# Patient Record
Sex: MALE | Race: WHITE | Employment: OTHER | URBAN - METROPOLITAN AREA
[De-identification: names, ages, dates, MRNs, and addresses within clinical notes are randomized per-mention and may not be internally consistent; named-entity substitution may affect disease eponyms.]

---

## 2021-03-27 DIAGNOSIS — Z11.59 ENCOUNTER FOR SCREENING FOR OTHER VIRAL DISEASES: ICD-10-CM

## 2021-04-08 RX ORDER — CELECOXIB 200 MG/1
200 CAPSULE ORAL DAILY PRN
COMMUNITY

## 2021-04-08 RX ORDER — SERTRALINE HYDROCHLORIDE 100 MG/1
200 TABLET, FILM COATED ORAL DAILY
COMMUNITY

## 2021-04-08 ASSESSMENT — MIFFLIN-ST. JEOR: SCORE: 2135.91

## 2021-04-13 ENCOUNTER — APPOINTMENT (OUTPATIENT)
Dept: GENERAL RADIOLOGY | Facility: CLINIC | Age: 46
End: 2021-04-13
Attending: ORTHOPAEDIC SURGERY
Payer: COMMERCIAL

## 2021-04-13 ENCOUNTER — ANESTHESIA EVENT (OUTPATIENT)
Dept: SURGERY | Facility: CLINIC | Age: 46
End: 2021-04-13
Payer: COMMERCIAL

## 2021-04-13 ENCOUNTER — ANESTHESIA (OUTPATIENT)
Dept: SURGERY | Facility: CLINIC | Age: 46
End: 2021-04-13
Payer: COMMERCIAL

## 2021-04-13 ENCOUNTER — HOSPITAL ENCOUNTER (OUTPATIENT)
Facility: CLINIC | Age: 46
Discharge: HOME | End: 2021-04-14
Attending: ORTHOPAEDIC SURGERY | Admitting: ORTHOPAEDIC SURGERY
Payer: COMMERCIAL

## 2021-04-13 DIAGNOSIS — M19.012 PRIMARY OSTEOARTHRITIS OF LEFT SHOULDER: Primary | ICD-10-CM

## 2021-04-13 LAB
ABO + RH BLD: NORMAL
ABO + RH BLD: NORMAL
BLD GP AB SCN SERPL QL: NORMAL
BLOOD BANK CMNT PATIENT-IMP: NORMAL
CREAT SERPL-MCNC: 0.79 MG/DL (ref 0.66–1.25)
GFR SERPL CREATININE-BSD FRML MDRD: >90 ML/MIN/{1.73_M2}
SPECIMEN EXP DATE BLD: NORMAL

## 2021-04-13 PROCEDURE — 86900 BLOOD TYPING SEROLOGIC ABO: CPT | Performed by: ANESTHESIOLOGY

## 2021-04-13 PROCEDURE — 258N000003 HC RX IP 258 OP 636: Performed by: NURSE ANESTHETIST, CERTIFIED REGISTERED

## 2021-04-13 PROCEDURE — 250N000013 HC RX MED GY IP 250 OP 250 PS 637: Performed by: ORTHOPAEDIC SURGERY

## 2021-04-13 PROCEDURE — 710N000009 HC RECOVERY PHASE 1, LEVEL 1, PER MIN: Performed by: ORTHOPAEDIC SURGERY

## 2021-04-13 PROCEDURE — 250N000011 HC RX IP 250 OP 636: Performed by: ORTHOPAEDIC SURGERY

## 2021-04-13 PROCEDURE — 250N000011 HC RX IP 250 OP 636: Performed by: NURSE ANESTHETIST, CERTIFIED REGISTERED

## 2021-04-13 PROCEDURE — 36415 COLL VENOUS BLD VENIPUNCTURE: CPT | Performed by: ANESTHESIOLOGY

## 2021-04-13 PROCEDURE — 86850 RBC ANTIBODY SCREEN: CPT | Performed by: ANESTHESIOLOGY

## 2021-04-13 PROCEDURE — C1713 ANCHOR/SCREW BN/BN,TIS/BN: HCPCS | Performed by: ORTHOPAEDIC SURGERY

## 2021-04-13 PROCEDURE — C1776 JOINT DEVICE (IMPLANTABLE): HCPCS | Performed by: ORTHOPAEDIC SURGERY

## 2021-04-13 PROCEDURE — 999N000179 XR SURGERY CARM FLUORO LESS THAN 5 MIN W STILLS: Mod: TC

## 2021-04-13 PROCEDURE — 999N000141 HC STATISTIC PRE-PROCEDURE NURSING ASSESSMENT: Performed by: ORTHOPAEDIC SURGERY

## 2021-04-13 PROCEDURE — 250N000009 HC RX 250: Performed by: ORTHOPAEDIC SURGERY

## 2021-04-13 PROCEDURE — 99203 OFFICE O/P NEW LOW 30 MIN: CPT | Performed by: INTERNAL MEDICINE

## 2021-04-13 PROCEDURE — 99207 PR CDG-CODE CATEGORY CHANGED: CPT | Performed by: INTERNAL MEDICINE

## 2021-04-13 PROCEDURE — 86901 BLOOD TYPING SEROLOGIC RH(D): CPT | Performed by: ANESTHESIOLOGY

## 2021-04-13 PROCEDURE — 370N000017 HC ANESTHESIA TECHNICAL FEE, PER MIN: Performed by: ORTHOPAEDIC SURGERY

## 2021-04-13 PROCEDURE — 82565 ASSAY OF CREATININE: CPT | Performed by: ORTHOPAEDIC SURGERY

## 2021-04-13 PROCEDURE — 360N000084 HC SURGERY LEVEL 4 W/ FLUORO, PER MIN: Performed by: ORTHOPAEDIC SURGERY

## 2021-04-13 PROCEDURE — 271N000001 HC OR GENERAL SUPPLY NON-STERILE: Performed by: ORTHOPAEDIC SURGERY

## 2021-04-13 PROCEDURE — 36415 COLL VENOUS BLD VENIPUNCTURE: CPT | Performed by: ORTHOPAEDIC SURGERY

## 2021-04-13 PROCEDURE — 258N000003 HC RX IP 258 OP 636: Performed by: ORTHOPAEDIC SURGERY

## 2021-04-13 PROCEDURE — 250N000009 HC RX 250: Performed by: NURSE ANESTHETIST, CERTIFIED REGISTERED

## 2021-04-13 PROCEDURE — 272N000001 HC OR GENERAL SUPPLY STERILE: Performed by: ORTHOPAEDIC SURGERY

## 2021-04-13 DEVICE — IMPLANTABLE DEVICE
Type: IMPLANTABLE DEVICE | Site: SHOULDER | Status: FUNCTIONAL
Brand: EQUINOXE

## 2021-04-13 RX ORDER — NEOSTIGMINE METHYLSULFATE 1 MG/ML
VIAL (ML) INJECTION PRN
Status: DISCONTINUED | OUTPATIENT
Start: 2021-04-13 | End: 2021-04-13

## 2021-04-13 RX ORDER — ACETAMINOPHEN 325 MG/1
650 TABLET ORAL EVERY 4 HOURS PRN
Status: DISCONTINUED | OUTPATIENT
Start: 2021-04-16 | End: 2021-04-14 | Stop reason: HOSPADM

## 2021-04-13 RX ORDER — ACETAMINOPHEN 325 MG/1
975 TABLET ORAL EVERY 8 HOURS
Status: DISCONTINUED | OUTPATIENT
Start: 2021-04-13 | End: 2021-04-14 | Stop reason: HOSPADM

## 2021-04-13 RX ORDER — CEFAZOLIN SODIUM 1 G/50ML
2000 SOLUTION INTRAVENOUS
Status: COMPLETED | OUTPATIENT
Start: 2021-04-13 | End: 2021-04-14

## 2021-04-13 RX ORDER — HYDROMORPHONE HYDROCHLORIDE 1 MG/ML
.3-.5 INJECTION, SOLUTION INTRAMUSCULAR; INTRAVENOUS; SUBCUTANEOUS EVERY 10 MIN PRN
Status: DISCONTINUED | OUTPATIENT
Start: 2021-04-13 | End: 2021-04-13 | Stop reason: HOSPADM

## 2021-04-13 RX ORDER — HYDROMORPHONE HCL IN WATER/PF 6 MG/30 ML
0.2 PATIENT CONTROLLED ANALGESIA SYRINGE INTRAVENOUS
Status: DISCONTINUED | OUTPATIENT
Start: 2021-04-13 | End: 2021-04-14 | Stop reason: HOSPADM

## 2021-04-13 RX ORDER — KETOROLAC TROMETHAMINE 30 MG/ML
INJECTION, SOLUTION INTRAMUSCULAR; INTRAVENOUS PRN
Status: DISCONTINUED | OUTPATIENT
Start: 2021-04-13 | End: 2021-04-13

## 2021-04-13 RX ORDER — OXYCODONE HYDROCHLORIDE 5 MG/1
5-10 TABLET ORAL
Qty: 20 TABLET | Refills: 0 | Status: SHIPPED | OUTPATIENT
Start: 2021-04-13

## 2021-04-13 RX ORDER — GLYCOPYRROLATE 0.2 MG/ML
INJECTION, SOLUTION INTRAMUSCULAR; INTRAVENOUS PRN
Status: DISCONTINUED | OUTPATIENT
Start: 2021-04-13 | End: 2021-04-13

## 2021-04-13 RX ORDER — FAMOTIDINE 20 MG/1
20 TABLET, FILM COATED ORAL 2 TIMES DAILY
Status: DISCONTINUED | OUTPATIENT
Start: 2021-04-13 | End: 2021-04-14 | Stop reason: HOSPADM

## 2021-04-13 RX ORDER — PROCHLORPERAZINE MALEATE 5 MG
10 TABLET ORAL EVERY 6 HOURS PRN
Status: DISCONTINUED | OUTPATIENT
Start: 2021-04-13 | End: 2021-04-14 | Stop reason: HOSPADM

## 2021-04-13 RX ORDER — DOCUSATE SODIUM 100 MG/1
100 CAPSULE, LIQUID FILLED ORAL 2 TIMES DAILY
Status: DISCONTINUED | OUTPATIENT
Start: 2021-04-13 | End: 2021-04-14

## 2021-04-13 RX ORDER — HYDROXYZINE HYDROCHLORIDE 25 MG/1
25 TABLET, FILM COATED ORAL EVERY 6 HOURS PRN
Qty: 30 TABLET | Refills: 0 | Status: SHIPPED | OUTPATIENT
Start: 2021-04-13

## 2021-04-13 RX ORDER — NALOXONE HYDROCHLORIDE 0.4 MG/ML
0.4 INJECTION, SOLUTION INTRAMUSCULAR; INTRAVENOUS; SUBCUTANEOUS
Status: DISCONTINUED | OUTPATIENT
Start: 2021-04-13 | End: 2021-04-14 | Stop reason: HOSPADM

## 2021-04-13 RX ORDER — DEXAMETHASONE SODIUM PHOSPHATE 4 MG/ML
INJECTION, SOLUTION INTRA-ARTICULAR; INTRALESIONAL; INTRAMUSCULAR; INTRAVENOUS; SOFT TISSUE PRN
Status: DISCONTINUED | OUTPATIENT
Start: 2021-04-13 | End: 2021-04-13

## 2021-04-13 RX ORDER — ASPIRIN 81 MG/1
81 TABLET ORAL 2 TIMES DAILY
Status: DISCONTINUED | OUTPATIENT
Start: 2021-04-13 | End: 2021-04-14 | Stop reason: HOSPADM

## 2021-04-13 RX ORDER — ONDANSETRON 4 MG/1
4 TABLET, ORALLY DISINTEGRATING ORAL EVERY 6 HOURS PRN
Status: DISCONTINUED | OUTPATIENT
Start: 2021-04-13 | End: 2021-04-14 | Stop reason: HOSPADM

## 2021-04-13 RX ORDER — ACETAMINOPHEN 325 MG/1
975 TABLET ORAL ONCE
Status: COMPLETED | OUTPATIENT
Start: 2021-04-13 | End: 2021-04-13

## 2021-04-13 RX ORDER — OXYCODONE HYDROCHLORIDE 5 MG/1
5 TABLET ORAL EVERY 4 HOURS PRN
Status: DISCONTINUED | OUTPATIENT
Start: 2021-04-13 | End: 2021-04-14 | Stop reason: HOSPADM

## 2021-04-13 RX ORDER — ONDANSETRON 2 MG/ML
INJECTION INTRAMUSCULAR; INTRAVENOUS PRN
Status: DISCONTINUED | OUTPATIENT
Start: 2021-04-13 | End: 2021-04-13

## 2021-04-13 RX ORDER — VANCOMYCIN HYDROCHLORIDE 1 G/20ML
INJECTION, POWDER, LYOPHILIZED, FOR SOLUTION INTRAVENOUS PRN
Status: DISCONTINUED | OUTPATIENT
Start: 2021-04-13 | End: 2021-04-13 | Stop reason: HOSPADM

## 2021-04-13 RX ORDER — SERTRALINE HYDROCHLORIDE 100 MG/1
200 TABLET, FILM COATED ORAL DAILY
Status: DISCONTINUED | OUTPATIENT
Start: 2021-04-13 | End: 2021-04-14 | Stop reason: HOSPADM

## 2021-04-13 RX ORDER — HYDROMORPHONE HYDROCHLORIDE 1 MG/ML
0.4 INJECTION, SOLUTION INTRAMUSCULAR; INTRAVENOUS; SUBCUTANEOUS
Status: DISCONTINUED | OUTPATIENT
Start: 2021-04-13 | End: 2021-04-14 | Stop reason: HOSPADM

## 2021-04-13 RX ORDER — ASPIRIN 81 MG/1
81 TABLET ORAL 2 TIMES DAILY
Qty: 60 TABLET | Refills: 0 | Status: SHIPPED | OUTPATIENT
Start: 2021-04-13

## 2021-04-13 RX ORDER — ONDANSETRON 4 MG/1
4 TABLET, ORALLY DISINTEGRATING ORAL EVERY 30 MIN PRN
Status: DISCONTINUED | OUTPATIENT
Start: 2021-04-13 | End: 2021-04-13 | Stop reason: HOSPADM

## 2021-04-13 RX ORDER — CEFAZOLIN SODIUM IN 0.9 % NACL 3 G/100 ML
3 INTRAVENOUS SOLUTION, PIGGYBACK (ML) INTRAVENOUS
Status: COMPLETED | OUTPATIENT
Start: 2021-04-13 | End: 2021-04-13

## 2021-04-13 RX ORDER — SODIUM CHLORIDE, SODIUM LACTATE, POTASSIUM CHLORIDE, CALCIUM CHLORIDE 600; 310; 30; 20 MG/100ML; MG/100ML; MG/100ML; MG/100ML
INJECTION, SOLUTION INTRAVENOUS CONTINUOUS PRN
Status: DISCONTINUED | OUTPATIENT
Start: 2021-04-13 | End: 2021-04-13

## 2021-04-13 RX ORDER — OXYCODONE HYDROCHLORIDE 5 MG/1
10 TABLET ORAL EVERY 4 HOURS PRN
Status: DISCONTINUED | OUTPATIENT
Start: 2021-04-13 | End: 2021-04-14 | Stop reason: HOSPADM

## 2021-04-13 RX ORDER — DIMENHYDRINATE 50 MG/ML
25 INJECTION, SOLUTION INTRAMUSCULAR; INTRAVENOUS
Status: DISCONTINUED | OUTPATIENT
Start: 2021-04-13 | End: 2021-04-13 | Stop reason: HOSPADM

## 2021-04-13 RX ORDER — CELECOXIB 200 MG/1
200 CAPSULE ORAL DAILY
Qty: 14 CAPSULE | Refills: 0 | Status: SHIPPED | OUTPATIENT
Start: 2021-04-13 | End: 2021-04-27

## 2021-04-13 RX ORDER — ONDANSETRON 2 MG/ML
4 INJECTION INTRAMUSCULAR; INTRAVENOUS EVERY 6 HOURS PRN
Status: DISCONTINUED | OUTPATIENT
Start: 2021-04-13 | End: 2021-04-14 | Stop reason: HOSPADM

## 2021-04-13 RX ORDER — KETOROLAC TROMETHAMINE 15 MG/ML
15 INJECTION, SOLUTION INTRAMUSCULAR; INTRAVENOUS EVERY 6 HOURS
Status: COMPLETED | OUTPATIENT
Start: 2021-04-13 | End: 2021-04-14

## 2021-04-13 RX ORDER — SODIUM CHLORIDE, SODIUM LACTATE, POTASSIUM CHLORIDE, CALCIUM CHLORIDE 600; 310; 30; 20 MG/100ML; MG/100ML; MG/100ML; MG/100ML
INJECTION, SOLUTION INTRAVENOUS CONTINUOUS
Status: DISCONTINUED | OUTPATIENT
Start: 2021-04-13 | End: 2021-04-14 | Stop reason: HOSPADM

## 2021-04-13 RX ORDER — ONDANSETRON 2 MG/ML
4 INJECTION INTRAMUSCULAR; INTRAVENOUS EVERY 30 MIN PRN
Status: DISCONTINUED | OUTPATIENT
Start: 2021-04-13 | End: 2021-04-13 | Stop reason: HOSPADM

## 2021-04-13 RX ORDER — BISACODYL 10 MG
10 SUPPOSITORY, RECTAL RECTAL DAILY PRN
Status: DISCONTINUED | OUTPATIENT
Start: 2021-04-13 | End: 2021-04-14 | Stop reason: HOSPADM

## 2021-04-13 RX ORDER — CELECOXIB 200 MG/1
400 CAPSULE ORAL ONCE
Status: COMPLETED | OUTPATIENT
Start: 2021-04-13 | End: 2021-04-13

## 2021-04-13 RX ORDER — KETAMINE HYDROCHLORIDE 10 MG/ML
INJECTION INTRAMUSCULAR; INTRAVENOUS PRN
Status: DISCONTINUED | OUTPATIENT
Start: 2021-04-13 | End: 2021-04-13

## 2021-04-13 RX ORDER — LABETALOL HYDROCHLORIDE 5 MG/ML
INJECTION, SOLUTION INTRAVENOUS PRN
Status: DISCONTINUED | OUTPATIENT
Start: 2021-04-13 | End: 2021-04-13

## 2021-04-13 RX ORDER — NALOXONE HYDROCHLORIDE 0.4 MG/ML
0.4 INJECTION, SOLUTION INTRAMUSCULAR; INTRAVENOUS; SUBCUTANEOUS
Status: DISCONTINUED | OUTPATIENT
Start: 2021-04-13 | End: 2021-04-13 | Stop reason: HOSPADM

## 2021-04-13 RX ORDER — KETOROLAC TROMETHAMINE 30 MG/ML
30 INJECTION, SOLUTION INTRAMUSCULAR; INTRAVENOUS EVERY 6 HOURS PRN
Status: DISCONTINUED | OUTPATIENT
Start: 2021-04-13 | End: 2021-04-13 | Stop reason: HOSPADM

## 2021-04-13 RX ORDER — AMOXICILLIN 250 MG
1 CAPSULE ORAL 2 TIMES DAILY
Status: DISCONTINUED | OUTPATIENT
Start: 2021-04-13 | End: 2021-04-14 | Stop reason: HOSPADM

## 2021-04-13 RX ORDER — POLYETHYLENE GLYCOL 3350 17 G/17G
17 POWDER, FOR SOLUTION ORAL DAILY
Status: DISCONTINUED | OUTPATIENT
Start: 2021-04-14 | End: 2021-04-14 | Stop reason: HOSPADM

## 2021-04-13 RX ORDER — NALOXONE HYDROCHLORIDE 0.4 MG/ML
0.2 INJECTION, SOLUTION INTRAMUSCULAR; INTRAVENOUS; SUBCUTANEOUS
Status: DISCONTINUED | OUTPATIENT
Start: 2021-04-13 | End: 2021-04-13 | Stop reason: HOSPADM

## 2021-04-13 RX ORDER — MEPERIDINE HYDROCHLORIDE 25 MG/ML
12.5 INJECTION INTRAMUSCULAR; INTRAVENOUS; SUBCUTANEOUS
Status: DISCONTINUED | OUTPATIENT
Start: 2021-04-13 | End: 2021-04-13 | Stop reason: HOSPADM

## 2021-04-13 RX ORDER — CEFAZOLIN SODIUM 1 G/3ML
1 INJECTION, POWDER, FOR SOLUTION INTRAMUSCULAR; INTRAVENOUS SEE ADMIN INSTRUCTIONS
Status: DISCONTINUED | OUTPATIENT
Start: 2021-04-13 | End: 2021-04-13 | Stop reason: HOSPADM

## 2021-04-13 RX ORDER — ACETAMINOPHEN 325 MG/1
650 TABLET ORAL EVERY 4 HOURS PRN
Qty: 100 TABLET | Refills: 0 | Status: SHIPPED | OUTPATIENT
Start: 2021-04-13

## 2021-04-13 RX ORDER — HYDROXYZINE HYDROCHLORIDE 25 MG/1
25 TABLET, FILM COATED ORAL EVERY 6 HOURS PRN
Status: DISCONTINUED | OUTPATIENT
Start: 2021-04-13 | End: 2021-04-14 | Stop reason: HOSPADM

## 2021-04-13 RX ORDER — PROPOFOL 10 MG/ML
INJECTION, EMULSION INTRAVENOUS PRN
Status: DISCONTINUED | OUTPATIENT
Start: 2021-04-13 | End: 2021-04-13

## 2021-04-13 RX ORDER — LIDOCAINE HYDROCHLORIDE 10 MG/ML
INJECTION, SOLUTION INFILTRATION; PERINEURAL PRN
Status: DISCONTINUED | OUTPATIENT
Start: 2021-04-13 | End: 2021-04-13

## 2021-04-13 RX ORDER — FENTANYL CITRATE 50 UG/ML
INJECTION, SOLUTION INTRAMUSCULAR; INTRAVENOUS PRN
Status: DISCONTINUED | OUTPATIENT
Start: 2021-04-13 | End: 2021-04-13

## 2021-04-13 RX ORDER — SODIUM CHLORIDE, SODIUM LACTATE, POTASSIUM CHLORIDE, CALCIUM CHLORIDE 600; 310; 30; 20 MG/100ML; MG/100ML; MG/100ML; MG/100ML
INJECTION, SOLUTION INTRAVENOUS CONTINUOUS
Status: DISCONTINUED | OUTPATIENT
Start: 2021-04-13 | End: 2021-04-13 | Stop reason: HOSPADM

## 2021-04-13 RX ORDER — TRANEXAMIC ACID 650 MG/1
1950 TABLET ORAL ONCE
Status: COMPLETED | OUTPATIENT
Start: 2021-04-13 | End: 2021-04-13

## 2021-04-13 RX ORDER — NALOXONE HYDROCHLORIDE 0.4 MG/ML
0.2 INJECTION, SOLUTION INTRAMUSCULAR; INTRAVENOUS; SUBCUTANEOUS
Status: DISCONTINUED | OUTPATIENT
Start: 2021-04-13 | End: 2021-04-14 | Stop reason: HOSPADM

## 2021-04-13 RX ORDER — LIDOCAINE 40 MG/G
CREAM TOPICAL
Status: DISCONTINUED | OUTPATIENT
Start: 2021-04-13 | End: 2021-04-13 | Stop reason: HOSPADM

## 2021-04-13 RX ORDER — VANCOMYCIN HYDROCHLORIDE 1 G/200ML
1000 INJECTION, SOLUTION INTRAVENOUS EVERY 12 HOURS
Status: COMPLETED | OUTPATIENT
Start: 2021-04-13 | End: 2021-04-14

## 2021-04-13 RX ORDER — METOPROLOL TARTRATE 1 MG/ML
1-2 INJECTION, SOLUTION INTRAVENOUS EVERY 5 MIN PRN
Status: DISCONTINUED | OUTPATIENT
Start: 2021-04-13 | End: 2021-04-13 | Stop reason: HOSPADM

## 2021-04-13 RX ORDER — CEFAZOLIN SODIUM 2 G/100ML
2 INJECTION, SOLUTION INTRAVENOUS EVERY 8 HOURS
Status: COMPLETED | OUTPATIENT
Start: 2021-04-13 | End: 2021-04-14

## 2021-04-13 RX ORDER — FENTANYL CITRATE 50 UG/ML
25-50 INJECTION, SOLUTION INTRAMUSCULAR; INTRAVENOUS
Status: DISCONTINUED | OUTPATIENT
Start: 2021-04-13 | End: 2021-04-13 | Stop reason: HOSPADM

## 2021-04-13 RX ORDER — VANCOMYCIN HYDROCHLORIDE 1 G/200ML
1000 INJECTION, SOLUTION INTRAVENOUS EVERY 12 HOURS
Status: DISCONTINUED | OUTPATIENT
Start: 2021-04-13 | End: 2021-04-13

## 2021-04-13 RX ORDER — LIDOCAINE 40 MG/G
CREAM TOPICAL
Status: DISCONTINUED | OUTPATIENT
Start: 2021-04-13 | End: 2021-04-14 | Stop reason: HOSPADM

## 2021-04-13 RX ORDER — HYDRALAZINE HYDROCHLORIDE 20 MG/ML
2.5-5 INJECTION INTRAMUSCULAR; INTRAVENOUS EVERY 10 MIN PRN
Status: DISCONTINUED | OUTPATIENT
Start: 2021-04-13 | End: 2021-04-13 | Stop reason: HOSPADM

## 2021-04-13 RX ADMIN — ACETAMINOPHEN 975 MG: 325 TABLET, FILM COATED ORAL at 06:43

## 2021-04-13 RX ADMIN — FENTANYL CITRATE 100 MCG: 50 INJECTION, SOLUTION INTRAMUSCULAR; INTRAVENOUS at 07:55

## 2021-04-13 RX ADMIN — LIDOCAINE HYDROCHLORIDE 50 MG: 10 INJECTION, SOLUTION INFILTRATION; PERINEURAL at 07:55

## 2021-04-13 RX ADMIN — PROPOFOL 50 MG: 10 INJECTION, EMULSION INTRAVENOUS at 08:29

## 2021-04-13 RX ADMIN — KETOROLAC TROMETHAMINE 15 MG: 15 INJECTION, SOLUTION INTRAMUSCULAR; INTRAVENOUS at 16:23

## 2021-04-13 RX ADMIN — PROPOFOL 50 MG: 10 INJECTION, EMULSION INTRAVENOUS at 10:52

## 2021-04-13 RX ADMIN — LABETALOL HYDROCHLORIDE 5 MG: 5 INJECTION, SOLUTION INTRAVENOUS at 08:39

## 2021-04-13 RX ADMIN — ASPIRIN 81 MG: 81 TABLET ORAL at 20:35

## 2021-04-13 RX ADMIN — VANCOMYCIN HYDROCHLORIDE 1000 MG: 1 INJECTION, SOLUTION INTRAVENOUS at 18:49

## 2021-04-13 RX ADMIN — Medication 3 G: at 08:26

## 2021-04-13 RX ADMIN — OXYCODONE HYDROCHLORIDE 5 MG: 5 TABLET ORAL at 18:58

## 2021-04-13 RX ADMIN — ROCURONIUM BROMIDE 50 MG: 10 INJECTION INTRAVENOUS at 07:55

## 2021-04-13 RX ADMIN — HYDROMORPHONE HYDROCHLORIDE 0.2 MG: 0.2 INJECTION, SOLUTION INTRAMUSCULAR; INTRAVENOUS; SUBCUTANEOUS at 20:33

## 2021-04-13 RX ADMIN — SODIUM CHLORIDE, POTASSIUM CHLORIDE, SODIUM LACTATE AND CALCIUM CHLORIDE: 600; 310; 30; 20 INJECTION, SOLUTION INTRAVENOUS at 07:45

## 2021-04-13 RX ADMIN — SODIUM CHLORIDE, POTASSIUM CHLORIDE, SODIUM LACTATE AND CALCIUM CHLORIDE: 600; 310; 30; 20 INJECTION, SOLUTION INTRAVENOUS at 09:20

## 2021-04-13 RX ADMIN — ONDANSETRON HYDROCHLORIDE 4 MG: 2 INJECTION, SOLUTION INTRAVENOUS at 08:28

## 2021-04-13 RX ADMIN — PROPOFOL 100 MG: 10 INJECTION, EMULSION INTRAVENOUS at 08:20

## 2021-04-13 RX ADMIN — MIDAZOLAM 2 MG: 1 INJECTION INTRAMUSCULAR; INTRAVENOUS at 07:48

## 2021-04-13 RX ADMIN — HYDROMORPHONE HYDROCHLORIDE 0.5 MG: 1 INJECTION, SOLUTION INTRAMUSCULAR; INTRAVENOUS; SUBCUTANEOUS at 08:31

## 2021-04-13 RX ADMIN — SERTRALINE HYDROCHLORIDE 200 MG: 100 TABLET ORAL at 13:57

## 2021-04-13 RX ADMIN — VANCOMYCIN HYDROCHLORIDE 2000 MG: 1 INJECTION, POWDER, LYOPHILIZED, FOR SOLUTION INTRAVENOUS at 06:42

## 2021-04-13 RX ADMIN — PROPOFOL 200 MG: 10 INJECTION, EMULSION INTRAVENOUS at 07:55

## 2021-04-13 RX ADMIN — GLYCOPYRROLATE 0.2 MG: 0.2 INJECTION, SOLUTION INTRAMUSCULAR; INTRAVENOUS at 07:55

## 2021-04-13 RX ADMIN — Medication 20 MG: at 08:36

## 2021-04-13 RX ADMIN — DOCUSATE SODIUM 50 MG AND SENNOSIDES 8.6 MG 1 TABLET: 8.6; 5 TABLET, FILM COATED ORAL at 20:35

## 2021-04-13 RX ADMIN — ACETAMINOPHEN 975 MG: 325 TABLET, FILM COATED ORAL at 13:57

## 2021-04-13 RX ADMIN — ACETAMINOPHEN 975 MG: 325 TABLET, FILM COATED ORAL at 22:04

## 2021-04-13 RX ADMIN — HYDROMORPHONE HYDROCHLORIDE 0.5 MG: 1 INJECTION, SOLUTION INTRAMUSCULAR; INTRAVENOUS; SUBCUTANEOUS at 08:23

## 2021-04-13 RX ADMIN — SODIUM CHLORIDE, POTASSIUM CHLORIDE, SODIUM LACTATE AND CALCIUM CHLORIDE: 600; 310; 30; 20 INJECTION, SOLUTION INTRAVENOUS at 16:22

## 2021-04-13 RX ADMIN — HYDROMORPHONE HYDROCHLORIDE 0.5 MG: 1 INJECTION, SOLUTION INTRAMUSCULAR; INTRAVENOUS; SUBCUTANEOUS at 10:55

## 2021-04-13 RX ADMIN — NEOSTIGMINE METHYLSULFATE 5 MG: 1 INJECTION, SOLUTION INTRAVENOUS at 10:56

## 2021-04-13 RX ADMIN — KETOROLAC TROMETHAMINE 30 MG: 30 INJECTION, SOLUTION INTRAMUSCULAR at 10:44

## 2021-04-13 RX ADMIN — Medication 1 G: at 10:26

## 2021-04-13 RX ADMIN — HYDROMORPHONE HYDROCHLORIDE 0.5 MG: 1 INJECTION, SOLUTION INTRAMUSCULAR; INTRAVENOUS; SUBCUTANEOUS at 10:48

## 2021-04-13 RX ADMIN — TRANEXAMIC ACID 1950 MG: 650 TABLET ORAL at 06:43

## 2021-04-13 RX ADMIN — PROPOFOL 50 MG: 10 INJECTION, EMULSION INTRAVENOUS at 08:06

## 2021-04-13 RX ADMIN — DEXAMETHASONE SODIUM PHOSPHATE 4 MG: 4 INJECTION, SOLUTION INTRA-ARTICULAR; INTRALESIONAL; INTRAMUSCULAR; INTRAVENOUS; SOFT TISSUE at 07:55

## 2021-04-13 RX ADMIN — OXYCODONE HYDROCHLORIDE 10 MG: 5 TABLET ORAL at 23:27

## 2021-04-13 RX ADMIN — KETOROLAC TROMETHAMINE 15 MG: 15 INJECTION, SOLUTION INTRAMUSCULAR; INTRAVENOUS at 22:04

## 2021-04-13 RX ADMIN — DOCUSATE SODIUM 100 MG: 100 CAPSULE, LIQUID FILLED ORAL at 20:35

## 2021-04-13 RX ADMIN — CELECOXIB 400 MG: 200 CAPSULE ORAL at 06:44

## 2021-04-13 RX ADMIN — CEFAZOLIN SODIUM 2 G: 2 INJECTION, SOLUTION INTRAVENOUS at 18:03

## 2021-04-13 RX ADMIN — GLYCOPYRROLATE 0.8 MG: 0.2 INJECTION, SOLUTION INTRAMUSCULAR; INTRAVENOUS at 10:56

## 2021-04-13 RX ADMIN — FAMOTIDINE 20 MG: 20 TABLET ORAL at 20:35

## 2021-04-13 ASSESSMENT — MIFFLIN-ST. JEOR: SCORE: 2189.44

## 2021-04-13 NOTE — OP NOTE
Procedure Date: 04/13/2021      PREOPERATIVE DIAGNOSIS:  Severe degenerative arthritis, left glenohumeral joint.      POSTOPERATIVE DIAGNOSIS:  Severe degenerative arthritis, left glenohumeral joint.      PROCEDURES PERFORMED:  Navigated anatomic left total shoulder arthroplasty using the [employer] total shoulder system with a large-caged press-fit left 8 degree augmented polyethylene glenoid, a 7 mm preserved short humeral stem, a 1.5 mm replicator plate with a 50 mm short humeral head.      SURGEON:  Gene Santos MD      FIRST ASSISTANT:  Patti Nelson PA-C (A skilled first assistant was necessary throughout portions of the case in order to assist with patient positioning, retraction, wound closure, dressing and sling application).      ANESTHESIA:  General with supraclavicular block.      ESTIMATED BLOOD LOSS:  Less than 75 mL      PREOPERATIVE ANTIBIOTICS:  Ancef 2 grams IV with 2 grams of vancomycin IV, as he has a history of a remote MRSA infection, along with 1.95 grams of tranexamic acid.      COMPLICATIONS:  None noted.      INDICATIONS:  Cas is a pleasant 45-year-old gentleman I have treated for quite some time with progressive degenerative arthritis of his left glenohumeral joint.  He underwent a hemiarthroplasty of his right shoulder many years ago with reasonable result.  Preoperative imaging including CT scan demonstrated significant posterior glenoid wear with approximately 19 degrees of retroversion.  Due to the amount of erosion and wear, his age and activity level, I felt that he would best be served by an augmented glenoid with a press-fit caged option.  All treatment options were discussed, the potential risks, benefits and complications were reviewed in detail.  We agreed to do this under navigation using the [employer] system.      DESCRIPTION OF PROCEDURE:  Cas was taken to the preoperative area, where a supraclavicular block was placed in the left upper extremity.  He was then  taken to the operating room and general anesthetic was obtained.  He was placed in the beach chair position.  His left shoulder was confirmed as the operative shoulder and prepped and draped in a sterile fashion.      After a timeout was performed, an approximately 5-inch incision was made from just above the coracoid distally and laterally.  Full-thickness skin flaps were created.  The cephalic vein was identified and retracted medially with the pectoralis, and the deltoid was elevated and retracted.  The subdeltoid space was freed up.  Hemostasis was obtained using the Aquamantys.  The conjoined tendon was identified and retracted medially.  Subscapularis was identified.  The axillary nerve was palpated and retracted and protected throughout the rest of the case.  The biceps tendon was tenodesed to the superior border of the pectoralis and then a tenotomy was performed just medial to the attachment of the lesser tuberosity.  The subscapularis was tagged as I went and released the rotator interval.  The joint was exposed.  I released the inferior and posterior capsule.  The large osteophytes off the humeral head were removed and the humerus was delivered out through the incision.  With excellent visualization, the intramedullary isaiah was placed and the head was resected along its articular surface in 30 degrees of retroversion and approximately 130 degree inclination.  I then broached to accept a size 7 short stem with good rotational stability; he had good bone.  A calcar planer was used to smooth out the articular surface.  Then a metal cap was placed over the humerus, which was then retracted posteriorly.  The subscapularis was then freed up circumferentially and the capsule was released from the undersurface and the undersurface of the coracoid was exposed, as was the superior surface.  The subscapularis was retracted medially on the glenoid.  A Bankart retractor was placed both anteriorly and posteriorly,  giving me excellent exposure.  The labrum was then removed.  The inferior capsule was released off the inferior aspect of the glenoid.  Any excess cartilage was removed.        At this point, the array was secured to the coracoid with 2 screws and registered.  I was then able to use the replicator and register the anatomy of the shoulder.  Once we were registered, we used the drill in the correct starting position and using computer navigation placed the central drill hole on the face of the glenoid and seated the drill bit.  I then used the reamer in a similar fashion, reaming down to the appropriate depth calculated in my preoperative plan.  Three remaining peripheral holes were then created with the drill bit and the guide as well and the trial implant was impacted with excellent stability.  I then opened the real large 8-degree posterior augmented polyethylene caged glenoid.  I packed the central cage with bone graft autograft from the humeral head.  I then impacted it into place with excellent fit and fill.        Once this was completed, I returned my attention to the humerus.  I placed the real stem after I placed two #2 FiberWire suture through drill holes in the lesser tuberosity around the stem.  The stem was seated, the replicator was sized to a 1.5 mm and the replicator plate was placed and secured and the set screw seated.  I then placed the 50 mm short head and when this was in the correct position, the offset was impacted.  Overall, the humeral head sized perfectly.  I then reduced the shoulder.  I irrigated throughout the case with dilute Betadine solution, as well as pulse normal saline.  I then repaired the subscapularis in a 2-row fashion with #2 FiberWire sutures in a horizontal mattress fashion and the entire lateral roll was all oversewn with a #1 Stratafix Plus.  I then placed a gram of vancomycin powder into the subdeltoid space and closed the deltopectoral interval with a running 0  Stratafix.  The skin was closed in layers with 0 Vicryl, 2-0 Vicryl, 3-0 Stratafix, Steri-Strips, and an Aquacel dressing.  X-rays confirmed excellent placement and sizing of my humeral and glenoid components.  I then placed him in a sling, awoken him and transferred to the recovery room in stable condition.      POSTOPERATIVE PLAN:  Admit him overnight for pain control and IV antibiotics and he will be discharged to home tomorrow.         EDIE STROUD MD             D: 2021   T: 2021   MT: [place]      Name:     JHONNY DRAKE   MRN:      [id number]        Account:        [id number]   :      1975           Procedure Date: 2021      Document: [id number]

## 2021-04-13 NOTE — ANESTHESIA PREPROCEDURE EVALUATION
Anesthesia Pre-Procedure Evaluation    Patient: Cas Moore   MRN: [id number] : 1975        Preoperative Diagnosis: DJD of left shoulder [M19.012]   Procedure : Procedure(s):  Left total shoulder arthroplasty     Past Medical History:   Diagnosis Date     Arthritis      Chronic infection     hx MRSA in elbow approx 3 yrs ago, treated w/antibiotics, then had to use nasal ointment for one week, cultures done x2 one week apart were negative.     Hx of pulmonary embolus      postop shoulder surg     Other chronic pain     right shoulder      Past Surgical History:   Procedure Laterality Date     ABDOMEN SURGERY      gastric bypass     ARTHROPLASTY SHOULDER Right 10/28/2014    Procedure: ARTHROPLASTY SHOULDER;  Surgeon: Gene Santos MD;  Location: [facility] OR     CHOLECYSTECTOMY      removed w/gastric bypass     ORTHOPEDIC SURGERY      acl repair left knee     ORTHOPEDIC SURGERY      knee scope left knee     ORTHOPEDIC SURGERY      slipped epiphysis left hip age 12     ORTHOPEDIC SURGERY      left elbow surg for bursitis      Allergies   Allergen Reactions     Morphine      Head severe itching      Social History     Tobacco Use     Smoking status: Never Smoker     Smokeless tobacco: Never Used   Substance Use Topics     Alcohol use: Not Currently      Wt Readings from Last 1 Encounters:   21 125.6 kg (276 lb 12.8 oz)        Anesthesia Evaluation   Pt has had prior anesthetic. Type: General.        ROS/MED HX  ENT/Pulmonary:  - neg pulmonary ROS     Neurologic:  - neg neurologic ROS     Cardiovascular:  - neg cardiovascular ROS     METS/Exercise Tolerance:     Hematologic: Comments: Lab Test        10/29/14     10/28/14                       0555          0621          WBC           --          8.0           HGB          12.3*        13.1*         MCV           --          90            PLT           --          270           INR          1.13         1.02           Lab Test        10/29/14     10/28/14                        0555          0621          NA            --          138           POTASSIUM     --          3.8           CHLORIDE      --          104           CO2           --          27            BUN           --          18            CR            --          0.92          ANIONGAP      --          7             NOAH           --          8.6           GLC          97           74                  Musculoskeletal:   (+) arthritis,     GI/Hepatic:  - neg GI/hepatic ROS     Renal/Genitourinary:  - neg Renal ROS     Endo:     (+) Obesity,     Psychiatric/Substance Use:  - neg psychiatric ROS     Infectious Disease:  - neg infectious disease ROS     Malignancy:  - neg malignancy ROS     Other:  - neg other ROS    (+) , H/O Chronic Pain,        Physical Exam    Airway        Mallampati: II   TM distance: > 3 FB   Neck ROM: full   Mouth opening: > 3 cm    Respiratory Devices and Support         Dental  no notable dental history         Cardiovascular   cardiovascular exam normal          Pulmonary   pulmonary exam normal                OUTSIDE LABS:  CBC:   Lab Results   Component Value Date    WBC 8.0 10/28/2014    HGB 12.3 (L) 10/29/2014    HGB 13.1 (L) 10/28/2014    HCT 40.0 10/28/2014     10/28/2014     BMP:   Lab Results   Component Value Date     10/28/2014    POTASSIUM 3.8 10/28/2014    CHLORIDE 104 10/28/2014    CO2 27 10/28/2014    BUN 18 10/28/2014    CR 0.92 10/28/2014    GLC 97 10/29/2014    GLC 74 10/28/2014     COAGS:   Lab Results   Component Value Date    PTT 27 10/28/2014    INR 1.13 10/29/2014     POC: No results found for: BGM, HCG, HCGS  HEPATIC: No results found for: ALBUMIN, PROTTOTAL, ALT, AST, GGT, ALKPHOS, BILITOTAL, BILIDIRECT, CIELO  OTHER:   Lab Results   Component Value Date    NOAH 8.6 10/28/2014       Anesthesia Plan    ASA Status:  3      Anesthesia Type: General.     - Airway: ETT   Induction: Propofol, Intravenous.   Maintenance: Balanced.        Consents    Anesthesia  Plan(s) and associated risks, benefits, and realistic alternatives discussed. Questions answered and patient/representative(s) expressed understanding.     - Discussed with:  Patient      - Extended Intubation/Ventilatory Support Discussed: No.      - Patient is DNR/DNI Status: No    Use of blood products discussed: Yes.     - Discussed with: Patient.     - Consented: consented to blood products     Postoperative Care    Pain management: IV analgesics.   PONV prophylaxis: Ondansetron (or other 5HT-3), Aprepitant     Comments:                Raymond Saelh MD

## 2021-04-13 NOTE — BRIEF OP NOTE
[facility]    Brief Operative Note    Pre-operative diagnosis: DJD of left shoulder [M19.012]  Post-operative diagnosis Same as pre-operative diagnosis    Procedure: Procedure(s):  Left total shoulder arthroplasty  Surgeon: Surgeon(s) and Role:     * Gene Santos MD - Primary     * Patti Nelson PA-C - Assisting  Anesthesia: Combined General with Block   Estimated blood loss: Less than 100 ml  Drains: None  Specimens: * No specimens in log *  Findings:   None.  Complications: None.  Implants:   Implant Name Type Inv. Item Serial No.  Lot No. LRB No. Used Action   [employer] Cage Glenoid Posterior Augment, Cemented Left, Large   [id number] [facility]  Left 1 Implanted   [employer] Preserve Humeral Stem Press-Fit, Plasma Coated   [id number] [facility]  Left 1 Implanted   E[employer] Torque Defining [address] Screw Kit   [id number] [facility]  Left 1 Implanted   IMP PLATE EQUINOXE REPLICATOR 1.5MM OFFSET 300-10-15 Metallic Hardware/[place] IMP PLATE EQUINOXE REPLICATOR 1.5MM OFFSET 300-10-15 [id number] [facility]  Left 1 Implanted   IMP HEAD HUMERAL EQUINOXE SHORT BETA 50MM 310-01-50 Total Joint Component/Insert IMP HEAD HUMERAL EQUINOXE SHORT BETA 50MM 310-01-50 [id number] [employer]  Left 1 Implanted

## 2021-04-13 NOTE — PLAN OF CARE
VSS.  RA while awake.  Denies pain; tylenol given per MAR.   No movement to LUE.  CMS intact.  Shadowing on shoulder dressing present.  Ice to incision; arm in sling.  Tolerating clear liquids; denies nausea.  Wife present at bedside.  Plan of care reviewed.

## 2021-04-13 NOTE — ANESTHESIA POSTPROCEDURE EVALUATION
Patient: Cas Moore    Procedure(s):  Left total shoulder arthroplasty    Diagnosis:DJD of left shoulder [M19.012]  Diagnosis Additional Information: No value filed.    Anesthesia Type:  General    Note:     Postop Pain Control: Uneventful            Sign Out: Well controlled pain   PONV: No   Neuro/Psych: Uneventful            Sign Out: Acceptable/Baseline neuro status   Airway/Respiratory: Uneventful            Sign Out: Acceptable/Baseline resp. status   CV/Hemodynamics: Uneventful            Sign Out: Acceptable CV status   Other NRE: NONE   DID A NON-ROUTINE EVENT OCCUR? No         Last vitals:  Vitals:    04/13/21 1140 04/13/21 1145 04/13/21 1200   BP: (!) 146/71 (!) 146/71 (!) 150/67   Pulse: 61 52 61   Resp: 20 12 15   Temp:      SpO2: 99% 99% 100%       Last vitals prior to Anesthesia Care Transfer:  CRNA VITALS  4/13/2021 1043 - 4/13/2021 1143      4/13/2021             Pulse:  109    SpO2:  (!) 81 %    Resp Rate (observed):  11    EKG:  Sinus rhythm          Electronically Signed By: Raymond Saleh MD  April 13, 2021  12:09 PM

## 2021-04-13 NOTE — ANESTHESIA PROCEDURE NOTES
Brachial plexus Procedure Note  Pre-Procedure   Staff -        Anesthesiologist:  Raymond Saleh MD       Performed By: anesthesiologist       Procedure Start/Stop Times: 4/13/2021 7:10 AM and 4/13/2021 7:22 AM       Pre-Anesthestic Checklist: patient identified, IV checked, site marked, risks and benefits discussed, informed consent, monitors and equipment checked, pre-op evaluation, at physician/surgeon's request and post-op pain management  Timeout:       Correct Patient: Yes        Correct Procedure: Yes        Correct Site: Yes        Correct Position: Yes        Correct Laterality: Yes        Site Marked: Yes  Procedure Documentation  Procedure: Brachial plexus       Diagnosis: DJD SHOULDER       Laterality: left       Patient Position: supine       Patient Prep/Sterile Barriers: sterile gloves, mask       Skin prep: Betadine       Needle Type: insulated and short bevel       Needle Gauge: 22.        Needle Length (Inches): 2        - Ultrasound guided       - Ultrasound used to identify targeted nerve, plexus, vascular marker, or fascial plane and place a needle adjacent to it in real-time       - Ultrasound was used to visualize the spread of anesthetic in close proximity to the above referenced structure      Nerve Stim: Initial Level 1 mA.  Lowest motor response mA.    Assessment/Narrative         The placement was negative for: blood aspirated, painful injection and site bleeding       Paresthesias: No.      Bolus given via needle. No blood aspirated via catheter.        Secured via.        Insertion/Infusion Method: Single Shot       Complications: none    Comments:  30ml of 0.5% Bupivicaine w/ 1:200,000 epi injected    The surgeon has given a verbal order transferring care of this patient to me for the performance of a regional analgesia block for post-op pain control. It is requested of me because I am uniquely trained and qualified to perform this block and the surgeon is neither trained nor  qualified to perform this procedure.

## 2021-04-13 NOTE — ANESTHESIA CARE TRANSFER NOTE
Patient: Cas Moore    Procedure(s):  Left total shoulder arthroplasty    Diagnosis: DJD of left shoulder [M19.012]  Diagnosis Additional Information: No value filed.    Anesthesia Type:   General     Note:    Oropharynx: oral airway in place  Level of Consciousness: drowsy  Oxygen Supplementation: face mask    Independent Airway: airway patency satisfactory and stable  Dentition: dentition unchanged  Vital Signs Stable: post-procedure vital signs reviewed and stable  Report to RN Given: handoff report given  Patient transferred to: PACU    Handoff Report: Identifed the Patient, Identified the Reponsible Provider, Reviewed the pertinent medical history, Discussed the surgical course, Reviewed Intra-OP anesthesia mangement and issues during anesthesia, Set expectations for post-procedure period and Allowed opportunity for questions and acknowledgement of understanding      Vitals: (Last set prior to Anesthesia Care Transfer)  CRNA VITALS  4/13/2021 1043 - 4/13/2021 1120      4/13/2021             Pulse:  109    SpO2:  (!) 81 %    Resp Rate (observed):  11        Electronically Signed By: LORI Le CRNA  April 13, 2021  11:20 AM

## 2021-04-13 NOTE — PHARMACY-ADMISSION MEDICATION HISTORY
Admission medication history interview status for this patient is complete. See [facility] admission navigator for allergy information, prior to admission medications and immunization status.     PTA meds completed by pre-admitting nurse Eliza Sams and reviewed by pharmacy     Prior to Admission medications    Medication Sig Last Dose Taking? Auth Provider   Multiple Vitamin (MULTIVITAMIN ADULT PO) Take 1 tablet by mouth daily  Yes Reported, Patient   sertraline (ZOLOFT) 100 MG tablet Take 200 mg by mouth daily  Yes Reported, Patient   celecoxib (CELEBREX) 200 MG capsule Take 200 mg by mouth daily as needed    Reported, Patient

## 2021-04-14 ENCOUNTER — APPOINTMENT (OUTPATIENT)
Dept: OCCUPATIONAL THERAPY | Facility: CLINIC | Age: 46
End: 2021-04-14
Attending: ORTHOPAEDIC SURGERY
Payer: COMMERCIAL

## 2021-04-14 VITALS
OXYGEN SATURATION: 98 % | HEART RATE: 42 BPM | TEMPERATURE: 96.6 F | WEIGHT: 276.8 LBS | SYSTOLIC BLOOD PRESSURE: 137 MMHG | HEIGHT: 73 IN | BODY MASS INDEX: 36.68 KG/M2 | DIASTOLIC BLOOD PRESSURE: 91 MMHG | RESPIRATION RATE: 16 BRPM

## 2021-04-14 LAB
ANION GAP SERPL CALCULATED.3IONS-SCNC: 1 MMOL/L (ref 3–14)
BUN SERPL-MCNC: 13 MG/DL (ref 7–30)
CALCIUM SERPL-MCNC: 8 MG/DL (ref 8.5–10.1)
CHLORIDE SERPL-SCNC: 109 MMOL/L (ref 94–109)
CO2 SERPL-SCNC: 28 MMOL/L (ref 20–32)
CREAT SERPL-MCNC: 0.82 MG/DL (ref 0.66–1.25)
GFR SERPL CREATININE-BSD FRML MDRD: >90 ML/MIN/{1.73_M2}
GLUCOSE SERPL-MCNC: 73 MG/DL (ref 70–99)
GLUCOSE SERPL-MCNC: 94 MG/DL (ref 70–99)
HGB BLD-MCNC: 12.5 G/DL (ref 13.3–17.7)
MAGNESIUM SERPL-MCNC: 2 MG/DL (ref 1.6–2.3)
POTASSIUM SERPL-SCNC: 4 MMOL/L (ref 3.4–5.3)
SODIUM SERPL-SCNC: 138 MMOL/L (ref 133–144)

## 2021-04-14 PROCEDURE — 83735 ASSAY OF MAGNESIUM: CPT | Performed by: INTERNAL MEDICINE

## 2021-04-14 PROCEDURE — 36415 COLL VENOUS BLD VENIPUNCTURE: CPT | Performed by: ORTHOPAEDIC SURGERY

## 2021-04-14 PROCEDURE — 85018 HEMOGLOBIN: CPT | Performed by: ORTHOPAEDIC SURGERY

## 2021-04-14 PROCEDURE — 97535 SELF CARE MNGMENT TRAINING: CPT | Mod: GO | Performed by: REHABILITATION PRACTITIONER

## 2021-04-14 PROCEDURE — 97110 THERAPEUTIC EXERCISES: CPT | Mod: GO | Performed by: REHABILITATION PRACTITIONER

## 2021-04-14 PROCEDURE — 250N000013 HC RX MED GY IP 250 OP 250 PS 637: Performed by: ORTHOPAEDIC SURGERY

## 2021-04-14 PROCEDURE — 97165 OT EVAL LOW COMPLEX 30 MIN: CPT | Mod: GO | Performed by: REHABILITATION PRACTITIONER

## 2021-04-14 PROCEDURE — 80048 BASIC METABOLIC PNL TOTAL CA: CPT | Performed by: INTERNAL MEDICINE

## 2021-04-14 PROCEDURE — 250N000011 HC RX IP 250 OP 636: Performed by: ORTHOPAEDIC SURGERY

## 2021-04-14 PROCEDURE — 36415 COLL VENOUS BLD VENIPUNCTURE: CPT | Performed by: INTERNAL MEDICINE

## 2021-04-14 PROCEDURE — 82947 ASSAY GLUCOSE BLOOD QUANT: CPT | Performed by: ORTHOPAEDIC SURGERY

## 2021-04-14 PROCEDURE — 258N000003 HC RX IP 258 OP 636: Performed by: ORTHOPAEDIC SURGERY

## 2021-04-14 RX ADMIN — HYDROXYZINE HYDROCHLORIDE 25 MG: 25 TABLET, FILM COATED ORAL at 09:42

## 2021-04-14 RX ADMIN — OXYCODONE HYDROCHLORIDE 5 MG: 5 TABLET ORAL at 08:44

## 2021-04-14 RX ADMIN — ASPIRIN 81 MG: 81 TABLET ORAL at 08:44

## 2021-04-14 RX ADMIN — DOCUSATE SODIUM 50 MG AND SENNOSIDES 8.6 MG 1 TABLET: 8.6; 5 TABLET, FILM COATED ORAL at 08:44

## 2021-04-14 RX ADMIN — POLYETHYLENE GLYCOL 3350 17 G: 17 POWDER, FOR SOLUTION ORAL at 08:45

## 2021-04-14 RX ADMIN — OXYCODONE HYDROCHLORIDE 5 MG: 5 TABLET ORAL at 03:32

## 2021-04-14 RX ADMIN — CEFAZOLIN SODIUM 2 G: 2 INJECTION, SOLUTION INTRAVENOUS at 01:46

## 2021-04-14 RX ADMIN — OXYCODONE HYDROCHLORIDE 5 MG: 5 TABLET ORAL at 09:51

## 2021-04-14 RX ADMIN — ACETAMINOPHEN 975 MG: 325 TABLET, FILM COATED ORAL at 06:28

## 2021-04-14 RX ADMIN — KETOROLAC TROMETHAMINE 15 MG: 15 INJECTION, SOLUTION INTRAMUSCULAR; INTRAVENOUS at 04:31

## 2021-04-14 RX ADMIN — VANCOMYCIN HYDROCHLORIDE 1000 MG: 1 INJECTION, SOLUTION INTRAVENOUS at 06:28

## 2021-04-14 RX ADMIN — SERTRALINE HYDROCHLORIDE 200 MG: 100 TABLET ORAL at 08:44

## 2021-04-14 RX ADMIN — FAMOTIDINE 20 MG: 20 TABLET ORAL at 08:45

## 2021-04-14 RX ADMIN — SODIUM CHLORIDE, POTASSIUM CHLORIDE, SODIUM LACTATE AND CALCIUM CHLORIDE: 600; 310; 30; 20 INJECTION, SOLUTION INTRAVENOUS at 02:04

## 2021-04-14 RX ADMIN — KETOROLAC TROMETHAMINE 15 MG: 15 INJECTION, SOLUTION INTRAMUSCULAR; INTRAVENOUS at 09:42

## 2021-04-14 ASSESSMENT — ACTIVITIES OF DAILY LIVING (ADL): IADL_COMMENTS: SPOUSE TO COMPLETE AS NEEDED

## 2021-04-14 NOTE — PROGRESS NOTES
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 Santos   Patient/Family Therapy Goal Statement (OT) to return home   Additional Occupational Profile Info/Pertinent History of Current Problem Patient is POD #1 L TSA   Existing Precautions/Restrictions shoulder   General Observations and Info Per orders; ACTIVE RANGE OF MOTION THE ELBOW AND WRIST NO RESTRICTIONS.   ONLY PASSIVE  PENDULUM TO THE SHOULDER WITH FLEXION TO 90.  NO EXTERNAL ROTATION PAST NEUTRAL.   Cognitive Status Examination   Orientation Status orientation to person, place and time   Visual Perception   Visual Impairment/Limitations corrective lenses full-time   Sensory   Sensory Quick Adds No deficits were identified   Pain Assessment   Patient Currently in Pain Yes, see Vital Sign flowsheet  (rating 7/10, increased with activity- RN aware)   Integumentary/Edema   Integumentary/Edema no deficits were identifed   Posture   Posture not impaired   Range of Motion Comprehensive   General Range of Motion no range of motion deficits identified  (in R UE)   Strength Comprehensive (MMT)   General Manual Muscle Testing (MMT) Assessment no strength deficits identified  (in R UE)   Muscle Tone Assessment   Muscle Tone Quick Adds No deficits were identified   Bed Mobility   Comment (Bed Mobility) I with bed mobility   Transfers   Transfer Comments I with functional mobility   Balance   Balance  Comments LOB was not noted   Activities of Daily Living   BADL Assessment/Intervention upper body dressing;toileting   Upper Body Dressing Assessment/Training   Comment (Upper Body Dressing) defer to OT daily note for details   Toileting   [place] (Toileting) independent   Instrumental Activities of Daily Living (IADL)   IADL Comments spouse to complete as needed   Clinical Impression   Criteria for Skilled Therapeutic Interventions Met (OT) yes;meets criteria;skilled treatment is necessary   OT Diagnosis decreased ADLS   OT Problem List-Impairments impacting ADL pain;post-surgical precautions;strength   Assessment of Occupational Performance 5 or more Performance Deficits   Identified Performance Deficits dsg, household chores, work duties, driving, errands   Planned Therapy Interventions (OT) ADL retraining;home program guidelines;progressive activity/exercise   Clinical Decision Making Complexity (OT) low complexity   Therapy Frequency (OT) 1x eval and treat   Risk & Benefits of therapy have been explained evaluation/treatment results reviewed;care plan/treatment goals reviewed;risks/benefits reviewed;participants voiced agreement with care plan;patient   OT Discharge Planning    OT Rationale for DC Rec defer to ortho team for discharge plan; patient has met needed goals for safe return home with family A for IADLs   Therapy Certification   Start of Care Date 04/14/21   Certification date from 04/14/21   Certification date to 04/14/21   Medical Diagnosis L TSA   Total Evaluation Time (Minutes)   Total Evaluation Time (Minutes) 5

## 2021-04-14 NOTE — PLAN OF CARE
Occupational Therapy Discharge Summary    Reason for therapy discharge:    All goals and outcomes met, no further needs identified.    Progress towards therapy goal(s). See goals on Care Plan in [facility] electronic health record for goal details.  Goals met    Therapy recommendation(s):    No further therapy is recommended.  Continue home exercise program.

## 2021-04-14 NOTE — PROGRESS NOTES
Hospitalist to bedside to assess patient. Per MD, will get labs, EKG, and put patient on telemetry. Patient aware of needing to transfer to a different floor.      0200 - patient transferred to room 202. Report given to receiving RN. All questions answered. PIV site c/d/i with ABX and IVF infusing per MAR. Patient in stable conditions and no new complaints at this time. Patient transferred with all his belongings.

## 2021-04-14 NOTE — CONSULTS
[facility]  Hospitalist Consultation    Date of Admission:  4/13/2021    Assessment & Plan   Cas Moore is a 45 year old male with PMHx of PE, MERLYN, gout, recent cellulitis of the right middle finger (evaluated in the ER 3/19/21 - treated with doxycyline), asthma, and depression who was admitted on 4/13/2021 with severe degenerative arthritis s/p left total shoulder arthroplasty.     Asymptomatic bradycardia: Nursing reports HR in the high 40s-50s awake and talking. BP wnl. No lightheaded or dizziness. No home cardiac medications. Of note, patient has been bradycardic all day 4/13 0628 AM HR 44, 1245 49, 1324 47, 1337 48, 2200 44.   -EKG now to assess for heart block/conduction issue. Tele monitoring overnight. No home cardiac medications. Reviewed MAR.   -Check BMP and mag  -Since patient is asymptomatic pending review of EKG and electrolytes no acute indication for intervention at this time. Supportive cares and monitor.     DJD s/p left total shoulder arthroplasty   -Orthopedic surgery primary. Surgery 4/13/21.  ml. No noted complications. Anesthesia combined general and nerve block. No immediate anesthesia complications.     Gout: Allopurinol 100 mg daily held. Only takes prn.     Anxiety: sertraline 200 mg daily    Obesity BMI 36: Complicates cares.     DVT Prophylaxis: Defer to primary service  Code Status: Full Code    Disposition: Expected discharge in 1 days once bradycardia stable and cleared by Ortho.    Page Hanson DO    Reason for Consult   Reason for consult: I was asked by Dr. Danielle Hayden to evaluate this patient for asymptomatic bradycarida.    Primary Care Physician   CAROL HIGGINS    Chief Complaint   Bradycardia  History is obtained from the patient and electronic medical record    History of Present Illness   Cas Moore is a 45 year old male with PMHx of PE, MERLYN, gout, recent cellulitis of the right middle finger (evaluated in the ER 3/19/21 - treated with  doxycyline), asthma, and depression who was admitted on 4/13/2021 with severe degenerative arthritis s/p left total shoulder arthroplasty.     Patient reports a history of bradycardia. Reports he is very active. Normally has a HR in the 60s on PCP evaluation. On arrival HR in the high 40s on capno while asleep. Increased to 60s when awake. No chest pain or palpitations. No respiratory symptoms. Pain in left shoulder present but controlled. Denies dizziness. No GI symptoms. No UTI symptoms. No numbness or sensation changes in left hand. Discussed with nursing at bedside.     Past Medical History    I have reviewed this patient's medical history and updated it with pertinent information if needed.   Past Medical History:   Diagnosis Date     Arthritis      Chronic infection     hx MRSA in elbow approx 3 yrs ago, treated w/antibiotics, then had to use nasal ointment for one week, cultures done x2 one week apart were negative.     Hx of pulmonary embolus      postop shoulder surg     Other chronic pain     right shoulder     Past Surgical History   I have reviewed this patient's surgical history and updated it with pertinent information if needed.  Past Surgical History:   Procedure Laterality Date     ABDOMEN SURGERY      gastric bypass     ARTHROPLASTY SHOULDER Right 10/28/2014    Procedure: ARTHROPLASTY SHOULDER;  Surgeon: Gene Santos MD;  Location: [facility] OR     CHOLECYSTECTOMY      removed w/gastric bypass     ORTHOPEDIC SURGERY      acl repair left knee     ORTHOPEDIC SURGERY      knee scope left knee     ORTHOPEDIC SURGERY      slipped epiphysis left hip age 12     ORTHOPEDIC SURGERY      left elbow surg for bursitis     Prior to Admission Medications   Prior to Admission Medications   Prescriptions Last Dose Informant Patient Reported? Taking?   ALLOPURINOL PO   Yes Yes   Sig: Take by mouth as needed   Multiple Vitamin (MULTIVITAMIN ADULT PO) Past Week at Unknown time  Yes Yes   Sig: Take 1 tablet by mouth  daily   celecoxib (CELEBREX) 200 MG capsule Past Week at Unknown time  Yes Yes   Sig: Take 200 mg by mouth daily as needed    sertraline (ZOLOFT) 100 MG tablet Past Week at Unknown time  Yes Yes   Sig: Take 200 mg by mouth daily      Facility-Administered Medications: None     Allergies   Allergies   Allergen Reactions     Morphine      Head severe itching     Social History   I have reviewed this patient's social history and updated it with pertinent information if needed. Cas Moore  reports that he has never smoked. He has never used smokeless tobacco. He reports previous alcohol use. He reports that he does not use drugs.    Family History   Family history reviewed with patient and is noncontributory.    Review of Systems   The 10 point Review of Systems is negative other than noted in the HPI    Physical Exam   Temp: 97.7  F (36.5  C) Temp src: Oral BP: 121/63 Pulse: 59   Resp: 17 SpO2: 97 % O2 Device: None (Room air) Oxygen Delivery: 2 LPM  Vital Signs with Ranges  Temp:  [97.3  F (36.3  C)-98.1  F (36.7  C)] 97.7  F (36.5  C)  Pulse:  [44-83] 59  Resp:  [11-29] 17  BP: (106-150)/(61-93) 121/63  SpO2:  [93 %-100 %] 97 %  276 lbs 12.8 oz    Constitutional: Awake (awoke from sleep), fatigued, cooperative, no apparent distress. Non-toxic. Appears stated age.   HEENT: Atraumatic. Normocephalic. Conjunctiva non-injected. Sclera anicteric. MMM. No erythema or exudates of posterior oral pharynx.   Respiratory: Moves air bilaterally. Clear to auscultation bilaterally, no crackles or wheezing  Cardiovascular: Regular rate and rhythm, normal S1 and S2, and no murmur noted  GI: Normal bowel sounds, soft, non-distended, non-tender  Skin/Integumen: No rashes, no cyanosis, no edema. Left shoulder with ice pack and surgical bandage.     Data   -Data reviewed today: All pertinent laboratory and imaging results from this encounter were reviewed. I personally reviewed    Recent Labs   Lab 04/13/21  1417   CR 0.79     Recent  Results (from the past 24 hour(s))   XR Surgery CHIQUI L/T 5 Min Fluoro w Stills    Narrative    This exam was marked as non-reportable because it will not be read by a   radiologist or a [facility] non-radiologist provider.

## 2021-04-14 NOTE — PROGRESS NOTES
[facility]  Daily Orthopedic Post-Op Note         Assessment and Plan:    Assessment:   Post-operative day #1  Procedure: left TSA   -Reports he did not sleep well due to alarms going off.  -bradycardia-patient states this is his baseline  -block starting to wear off. Motor function returned to hand and wrist, still numb to thumb  -dressing is C/D/I  Pain: 4/10      Plan:   -OT this morning to demonstrate AAROM exercises to elbow, wrist, and hand.   -current pain management with oxycodone, vistaril, tylenol, celebrex     Assessment:  Stable post op left TSA  Plan:  Mobilize  Disposition: Home  Anticipated date of discharge: today         Interval History:   Doing well.  Continues to improve.  Pain is well-controlled.  No fevers.                   Physical Exam:   137/91, 96.6, 42, 16  Dressing clean, dry and intact  Calves soft and non tender  Distal neurovascular exam normal for nerve block status           Data:      Hemoglobin:   Hemoglobin   Date Value Ref Range Status   10/29/2014 12.3 (L) 13.3 - 17.7 g/dL Final   10/28/2014 13.1 (L) 13.3 - 17.7 g/dL Final       Patti Nelson PA-C   [phone]

## 2021-04-14 NOTE — PLAN OF CARE
PRIMARY DIAGNOSIS:s/p L TSA  OUTPATIENT/OBSERVATION GOALS TO BE MET BEFORE DISCHARGE:  1. Stable vital signs Yes  2. Tolerating diet:Yes  3. Pain controlled with oral pain medications:  Yes  4. Positive bowel sounds:  Yes  5. Voiding without difficulty:  Yes  6. Able to ambulate:  Yes  7. Provider specific discharge goals met:  Yes    Discharge Planner Nurse   Safe discharge environment identified: Yes  Barriers to discharge: No       Entered by: Eliza Beth 04/14/2021      Please review provider order for any additional goals.   Nurse to notify provider when observation goals have been met and patient is ready for discharge.

## 2021-04-14 NOTE — PROGRESS NOTES
Pt states capno machine has been alarming and he has not gotten any sleep. No alarm observed in room except when nasal cannula piece was out of place. Pt stated he is seconds from calling wife to pick him up. Upset about having to move in the middle of the night. Educated pt that machine was for his benefit. Capno turned off as pt continues to refuse. Last EtCO2 35 and IPI 10.

## 2021-04-14 NOTE — PLAN OF CARE
See provider notification note re low HR late shift; awaiting hospitalist consult.  CMS LUE intact; sensation returning to all 5 digits on left hand with movement throughout.  Arm elevated on pillow with ice.  Dressing intact.  Using scheduled toradol and tylenol for pain control with oxy and dilaudid x 1 for breakthrough pain.  Will try 10 mg oxy next dose for better pain control.  Up at edge of bed, standing and then ambulating room independently.  Voiding.  Cont wit plan of care.

## 2021-04-14 NOTE — PLAN OF CARE
PRIMARY DIAGNOSIS: Left total shoulder arthroplasty  OUTPATIENT/OBSERVATION GOALS TO BE MET BEFORE DISCHARGE:  1. Stable vital signs Yes, except HR continues to be low in 40s  2. Tolerating diet: Yes  3. Pain controlled with oral pain medications:  No  4. Positive bowel sounds:  Yes  5. Voiding without difficulty:  Yes  6. Able to ambulate:  Yes  7. Provider specific discharge goals met:  No    Discharge Planner Nurse   Safe discharge environment identified: Yes  Barriers to discharge: Yes       Entered by: Fabio Burrows 04/14/2021 4:03 AM    Vitals are Temp: 97.8  F (36.6  C) Temp src: Oral BP: 111/75 Pulse: (!) 45   Resp: 14 SpO2: 98 %.  Patient is Alert and Oriented x4. Up Independently in room. Regular diet. Reports 2-3/10 pain and would like to keep on top of pain control, asked to be woken up. Oxy 5 mg given x1. Ice pack in place. Pt also on scheduled Tylenol and Toradol. Post op Ancef completed. Vanco to be given this morning. Denies dizziness, SOB and nausea. LS clear. Capno is use on RA. Tele- SB 40s per tele tech. Dressing to left shoulder CDI. Sling in use. Numbness to LUE improved. Sensation intact. Normal pulse. Pt is to be NWB to LUE. OT consulted. Ortho and Hospitalist following.        Please review provider order for any additional goals.   Nurse to notify provider when observation goals have been met and patient is ready for discharge.

## 2021-04-14 NOTE — PROGRESS NOTES
Vital Signs: patient afebrile. HR in 40s-50s. Hospitalist aware and came to bedside.   Pain/Comfort: patient rating pain as a 2-7/10. PRN pain medications given per MAR. Patient stating adequate relief with current pain regimen. Ice applied to L shoulder. Patient encouraged to call RN if pain starts to increase. Patient stated an understanding.   Assessment: L shoulder dressing c/d/i. L arm in sling per MD orders. Patient able to move all 5 fingers. PIV site c/d/i with IVF infusing per MAR.   Diet: patient tolerating PO intake.   Output: patient voiding independently.   Activity/Ambulation: patient up independently in room.   Social: patient calm and cooperative. Asking appropriate questions.   Plan: Monitor HR. Pain control. Maintain PIV. IVF. Administer medications per MAR. Monitor surgical incision site. Keep arm in sling per MD orders.

## 2021-04-15 NOTE — DISCHARGE SUMMARY
[facility] Discharge Summary    Cas Moore [id number]   Age: 45 year old 1975     Date of Admission:  4/13/2021  Date of Discharge::  4/14/2021 11:12 AM  Admitting Physician:  Gene Santos MD  Discharge Physician:  Gene Santos MD               Admission Diagnoses:   Osteoarthritis          Discharge Diagnosis:   Arthoplasty of the shoulder          Procedures:   Procedure(s): Total shoulder arthoplasty (Left)                Medications Prior to Admission:     No medications prior to admission.             Discharge Medications:     Discharge Medication List as of 4/14/2021 10:32 AM      START taking these medications    Details   acetaminophen (TYLENOL) 325 MG tablet Take 2 tablets (650 mg) by mouth every 4 hours as needed for other (mild pain), Disp-100 tablet, R-0, E-Prescribe      aspirin 81 MG EC tablet Take 1 tablet (81 mg) by mouth 2 times daily, Disp-60 tablet, R-0, E-Prescribe      !! celecoxib (CELEBREX) 200 MG capsule Take 1 capsule (200 mg) by mouth daily for 14 days Do not take within 6 hours of ibuprofen (MOTRIN, ADVIL) or ketorolac (TORADOL) if prescribed., Disp-14 capsule, R-0, E-Prescribe      hydrOXYzine (ATARAX) 25 MG tablet Take 1 tablet (25 mg) by mouth every 6 hours as needed for itching or anxiety (with pain, moderate pain), Disp-30 tablet, R-0, E-Prescribe      oxyCODONE (ROXICODONE) 5 MG tablet Take 1-2 tablets (5-10 mg) by mouth every 3 hours as needed for pain (Moderate to Severe), Disp-20 tablet, R-0, E-Prescribe       !! - Potential duplicate medications found. Please discuss with provider.      CONTINUE these medications which have NOT CHANGED    Details   ALLOPURINOL PO Take by mouth as needed, Historical      !! celecoxib (CELEBREX) 200 MG capsule Take 200 mg by mouth daily as needed , Historical      Multiple Vitamin (MULTIVITAMIN ADULT PO) Take 1 tablet by mouth daily, Historical      sertraline (ZOLOFT) 100 MG tablet Take 200 mg by mouth daily, Historical       !! -  Potential duplicate medications found. Please discuss with provider.                Consultations:   Consultation during this admission received from: N/A          Brief History of Illness:   Reason for admission requiring a surgical or invasive procedure:   Arthoplasty of the shoulder   The patient underwent the following procedure(s):   Total shoulder arthoplasty (Left)   There were no immediate complications during this procedure.    Please refer to the full operative summary for details.             Hospital Course:   The patient's hospital course was unremarkable.  The patient recovered as anticipated and experienced no post-operative complications.  They Did not receive a blood transfusion.          Discharge Instructions and Follow-Up:   Discharge diet: Regular   Discharge activity: Activity as tolerated  Sling full time except for showering and dressing or doing exercises.  May do codman passive pendulum exercised.  No active resisted internal rotation or external rotation past 20 degrees   Discharge follow-up: Follow up with me in 10-14 days   Anticoagulation Asprin 81 mg twice daily for 30 days   Wound care: Leave Aquacel bandage in place  May get bandage wet in shower but do not soak or scrub             Discharge Disposition:   Discharged to home      Attestation:  I have reviewed today's vital signs, notes, medications, labs and imaging.    Patti Nelson PA-C

## 2021-04-20 LAB — INTERPRETATION ECG - MUSE: NORMAL

## 2021-04-20 NOTE — PROGRESS NOTES
[facility]      OUTPATIENT OCCUPATIONAL THERAPY  EVALUATION  PLAN OF TREATMENT FOR OUTPATIENT REHABILITATION  (COMPLETE FOR INITIAL CLAIMS ONLY)  Patient's Last Name, First Name, M.I.  YOB: 1975  Cas Moore                             Provider's Name  [facility] Medical Record No.  [id number]                               Onset Date:  04/13/21   Start of Care Date:  04/14/21     Type:     ___PT   _X_OT   ___SLP Medical Diagnosis:  L TSA                        OT Diagnosis:  decreased ADLS   Visits from SOC:  1   _________________________________________________________________________________  Plan of Treatment/Functional Goals    Planned Interventions: ADL retraining, home program guidelines, progressive activity/exercise   Goals: See Occupational Therapy Goals on Care Plan in [employer] electronic health record.    Therapy Frequency: 1x eval and treat  Predicted Duration of Therapy Intervention:    _________________________________________________________________________________    I CERTIFY THE NEED FOR THESE SERVICES FURNISHED UNDER        THIS PLAN OF TREATMENT AND WHILE UNDER MY CARE     (Physician co-signature of this document indicates review and certification of the therapy plan).                Certification date from: 04/14/21, Certification date to: 04/14/21    Referring Physician: Dr. Santos            Initial Assessment        See Occupational Therapy evaluation dated 04/14/21 in [employer] electronic health record.
